# Patient Record
Sex: FEMALE | Race: WHITE | NOT HISPANIC OR LATINO
[De-identification: names, ages, dates, MRNs, and addresses within clinical notes are randomized per-mention and may not be internally consistent; named-entity substitution may affect disease eponyms.]

---

## 2018-03-21 PROBLEM — Z00.00 ENCOUNTER FOR PREVENTIVE HEALTH EXAMINATION: Status: ACTIVE | Noted: 2018-03-21

## 2018-06-19 ENCOUNTER — APPOINTMENT (OUTPATIENT)
Dept: FAMILY MEDICINE | Facility: CLINIC | Age: 28
End: 2018-06-19

## 2020-12-08 ENCOUNTER — TRANSCRIPTION ENCOUNTER (OUTPATIENT)
Age: 30
End: 2020-12-08

## 2022-02-22 ENCOUNTER — APPOINTMENT (OUTPATIENT)
Dept: OPHTHALMOLOGY | Facility: CLINIC | Age: 32
End: 2022-02-22

## 2022-10-24 ENCOUNTER — TRANSCRIPTION ENCOUNTER (OUTPATIENT)
Age: 32
End: 2022-10-24

## 2022-10-24 ENCOUNTER — APPOINTMENT (OUTPATIENT)
Dept: FAMILY MEDICINE | Facility: CLINIC | Age: 32
End: 2022-10-24

## 2022-10-24 ENCOUNTER — NON-APPOINTMENT (OUTPATIENT)
Age: 32
End: 2022-10-24

## 2022-10-24 VITALS
HEIGHT: 65 IN | DIASTOLIC BLOOD PRESSURE: 80 MMHG | HEART RATE: 65 BPM | SYSTOLIC BLOOD PRESSURE: 122 MMHG | OXYGEN SATURATION: 98 % | BODY MASS INDEX: 29.99 KG/M2 | WEIGHT: 180 LBS | RESPIRATION RATE: 14 BRPM

## 2022-10-24 VITALS — TEMPERATURE: 98.5 F

## 2022-10-24 DIAGNOSIS — Z83.438 FAMILY HISTORY OF OTHER DISORDER OF LIPOPROTEIN METABOLISM AND OTHER LIPIDEMIA: ICD-10-CM

## 2022-10-24 DIAGNOSIS — Z82.5 FAMILY HISTORY OF ASTHMA AND OTHER CHRONIC LOWER RESPIRATORY DISEASES: ICD-10-CM

## 2022-10-24 DIAGNOSIS — Z82.49 FAMILY HISTORY OF ISCHEMIC HEART DISEASE AND OTHER DISEASES OF THE CIRCULATORY SYSTEM: ICD-10-CM

## 2022-10-24 DIAGNOSIS — Z76.89 PERSONS ENCOUNTERING HEALTH SERVICES IN OTHER SPECIFIED CIRCUMSTANCES: ICD-10-CM

## 2022-10-24 PROCEDURE — G0444 DEPRESSION SCREEN ANNUAL: CPT | Mod: 59

## 2022-10-24 PROCEDURE — 99204 OFFICE O/P NEW MOD 45 MIN: CPT | Mod: 25

## 2022-10-24 NOTE — PAST MEDICAL HISTORY
----- Message from Taz Banda PA-C sent at 1/7/2021  9:00 AM CST -----  Please let the patient know that his cholesterol level-specifically his ratio-is elevated.  Given his age, we can try to treat this with diet and routine exercise, we would like to have this rechecked in 6 months.  The rest of his lab work looked good.  The next step for his back pain would be to follow up with the MRI as ordered.  He also does have a follow-up with Gastroenterology in just a few days.  He should keep that appointment as well.  Thank you.   [Menstruating] : hx of menstruating [Regular Cycle Intervals] : have been regular [Total Preg ___] : G: [unfilled] [Live Births___] : P: [unfilled]

## 2022-10-25 NOTE — PHYSICAL EXAM
[No Acute Distress] : no acute distress [Normal Sclera/Conjunctiva] : normal sclera/conjunctiva [Normal Outer Ear/Nose] : the outer ears and nose were normal in appearance [No JVD] : no jugular venous distention [No Lymphadenopathy] : no lymphadenopathy [Supple] : supple [Thyroid Normal, No Nodules] : the thyroid was normal and there were no nodules present [No Respiratory Distress] : no respiratory distress  [No Accessory Muscle Use] : no accessory muscle use [Clear to Auscultation] : lungs were clear to auscultation bilaterally [Normal Rate] : normal rate  [Regular Rhythm] : with a regular rhythm [Normal S1, S2] : normal S1 and S2 [No Murmur] : no murmur heard [Declined Rectal Exam] : declined rectal exam [Normal Gait] : normal gait [Normal Affect] : the affect was normal

## 2022-10-25 NOTE — HISTORY OF PRESENT ILLNESS
[FreeTextEntry8] : Patient presents to establish care. Female is  she is complaining of painless rectal bleeding since Friday, when she wipes there is blood on the toilet paper. Denies changes in vision, dizziness, chest pain, palpitations and lower extremity edema.\par \par \par

## 2022-10-25 NOTE — HEALTH RISK ASSESSMENT
[Intercurrent hospitalizations] : was admitted to the hospital  [Former] : Former [No] : In the past 12 months have you used drugs other than those required for medical reasons? No [0] : 2) Feeling down, depressed, or hopeless: Not at all (0) [PHQ-2 Negative - No further assessment needed] : PHQ-2 Negative - No further assessment needed [de-identified] : 8/2022 c- section  [de-identified] : GYN  [de-identified] : cardio  [[id number]] : 0

## 2022-10-25 NOTE — PLAN
[FreeTextEntry1] : recommended Sitz baths \par referred to colorectal surgeon if not improved \par continue prenatal\par RTO as routine for follow up.\par

## 2022-10-25 NOTE — REVIEW OF SYSTEMS
[Abdominal Pain] : no abdominal pain [Nausea] : no nausea [Constipation] : no constipation [Diarrhea] : diarrhea [Vomiting] : no vomiting [Heartburn] : no heartburn [Melena] : no melena [Negative] : Heme/Lymph [FreeTextEntry7] : internal hemorrhoids

## 2022-12-02 ENCOUNTER — APPOINTMENT (OUTPATIENT)
Dept: FAMILY MEDICINE | Facility: CLINIC | Age: 32
End: 2022-12-02

## 2022-12-02 VITALS — SYSTOLIC BLOOD PRESSURE: 146 MMHG | DIASTOLIC BLOOD PRESSURE: 86 MMHG

## 2022-12-02 DIAGNOSIS — R42 DIZZINESS AND GIDDINESS: ICD-10-CM

## 2022-12-02 DIAGNOSIS — R58 HEMORRHAGE, NOT ELSEWHERE CLASSIFIED: ICD-10-CM

## 2022-12-02 DIAGNOSIS — R55 DIZZINESS AND GIDDINESS: ICD-10-CM

## 2022-12-02 DIAGNOSIS — I10 ESSENTIAL (PRIMARY) HYPERTENSION: ICD-10-CM

## 2022-12-02 DIAGNOSIS — K64.8 OTHER HEMORRHOIDS: ICD-10-CM

## 2022-12-02 PROCEDURE — 99214 OFFICE O/P EST MOD 30 MIN: CPT | Mod: 95

## 2022-12-02 NOTE — HISTORY OF PRESENT ILLNESS
-Per primary team   -Expect stress hyperglycemia      [Home] : at home, [unfilled] , at the time of the visit. [Medical Office: ([facility])___] : at the medical office located in  [Verbal consent obtained from patient] : the patient, [unfilled] [FreeTextEntry8] : Verbal consent was obtained from patient for telephonic visit on 22\par \par 32 yr old  female who is breastfeeding states around 1 pm today became dizzy along with nausea. Dizziness is worse when turning head side to side. Admits to preeclampsia and has been taking BP today was 146/92. Denies syncope, palpitations. \par

## 2022-12-02 NOTE — PLAN
[FreeTextEntry1] : Will see patient in office to f/u with BP \par advised to monitor BP at home \par goal < 120/90 \par fall precautions advised \par stay hydrated and activity as tolerated

## 2022-12-04 ENCOUNTER — TRANSCRIPTION ENCOUNTER (OUTPATIENT)
Age: 32
End: 2022-12-04

## 2022-12-06 ENCOUNTER — APPOINTMENT (OUTPATIENT)
Dept: FAMILY MEDICINE | Facility: CLINIC | Age: 32
End: 2022-12-06

## 2023-05-30 ENCOUNTER — APPOINTMENT (OUTPATIENT)
Dept: OPHTHALMOLOGY | Facility: CLINIC | Age: 33
End: 2023-05-30
Payer: COMMERCIAL

## 2023-05-30 ENCOUNTER — NON-APPOINTMENT (OUTPATIENT)
Age: 33
End: 2023-05-30

## 2023-05-30 PROCEDURE — 92004 COMPRE OPH EXAM NEW PT 1/>: CPT

## 2024-04-26 ENCOUNTER — APPOINTMENT (OUTPATIENT)
Dept: FAMILY MEDICINE | Facility: CLINIC | Age: 34
End: 2024-04-26
Payer: COMMERCIAL

## 2024-04-26 DIAGNOSIS — R14.0 ABDOMINAL DISTENSION (GASEOUS): ICD-10-CM

## 2024-04-26 DIAGNOSIS — R63.5 ABNORMAL WEIGHT GAIN: ICD-10-CM

## 2024-04-26 PROCEDURE — 99214 OFFICE O/P EST MOD 30 MIN: CPT

## 2024-04-26 NOTE — PLAN
[FreeTextEntry1] : sent for labs due to c/o abdominal bloating  diet as tolerated  trial of probiotic

## 2024-04-26 NOTE — REVIEW OF SYSTEMS
[Recent Change In Weight] : ~T recent weight change [Negative] : Heme/Lymph [Fever] : no fever [Night Sweats] : no night sweats [Abdominal Pain] : no abdominal pain [Vomiting] : no vomiting [FreeTextEntry7] : bloating

## 2024-04-26 NOTE — HISTORY OF PRESENT ILLNESS
[Home] : at home, [unfilled] , at the time of the visit. [Medical Office: ([facility])___] : at the medical office located in  [Verbal consent obtained from patient] : the patient, [unfilled] [FreeTextEntry8] : Verbal consent was obtained from patient for telehealth visit on 4/26/24  States weight gain and abdominal bloating past 2 months. Denies abdominal pain, nausea and vomiting.

## 2024-06-19 ENCOUNTER — APPOINTMENT (OUTPATIENT)
Dept: OPHTHALMOLOGY | Facility: CLINIC | Age: 34
End: 2024-06-19

## 2024-06-26 ENCOUNTER — APPOINTMENT (OUTPATIENT)
Dept: FAMILY MEDICINE | Facility: CLINIC | Age: 34
End: 2024-06-26
Payer: COMMERCIAL

## 2024-06-26 DIAGNOSIS — N76.0 ACUTE VAGINITIS: ICD-10-CM

## 2024-06-26 DIAGNOSIS — B37.0 CANDIDAL STOMATITIS: ICD-10-CM

## 2024-06-26 DIAGNOSIS — B96.89 ACUTE VAGINITIS: ICD-10-CM

## 2024-06-26 PROCEDURE — 99442: CPT

## 2024-06-26 RX ORDER — NYSTATIN 100000 [USP'U]/ML
100000 SUSPENSION ORAL 4 TIMES DAILY
Qty: 200 | Refills: 0 | Status: ACTIVE | COMMUNITY
Start: 2024-06-26 | End: 1900-01-01

## 2024-06-26 RX ORDER — METRONIDAZOLE 500 MG/1
500 TABLET ORAL TWICE DAILY
Qty: 14 | Refills: 0 | Status: ACTIVE | COMMUNITY
Start: 2024-06-26 | End: 1900-01-01

## 2024-10-15 ENCOUNTER — NON-APPOINTMENT (OUTPATIENT)
Age: 34
End: 2024-10-15

## 2024-11-21 ENCOUNTER — TRANSCRIPTION ENCOUNTER (OUTPATIENT)
Age: 34
End: 2024-11-21

## 2025-04-14 ENCOUNTER — NON-APPOINTMENT (OUTPATIENT)
Age: 35
End: 2025-04-14

## 2025-04-16 ENCOUNTER — APPOINTMENT (OUTPATIENT)
Dept: OPHTHALMOLOGY | Facility: CLINIC | Age: 35
End: 2025-04-16
Payer: COMMERCIAL

## 2025-04-16 ENCOUNTER — NON-APPOINTMENT (OUTPATIENT)
Age: 35
End: 2025-04-16

## 2025-04-16 PROCEDURE — 92014 COMPRE OPH EXAM EST PT 1/>: CPT

## 2025-04-16 PROCEDURE — 92015 DETERMINE REFRACTIVE STATE: CPT

## 2025-07-17 ENCOUNTER — NON-APPOINTMENT (OUTPATIENT)
Age: 35
End: 2025-07-17

## 2025-07-17 ENCOUNTER — APPOINTMENT (OUTPATIENT)
Dept: OPHTHALMOLOGY | Facility: CLINIC | Age: 35
End: 2025-07-17
Payer: COMMERCIAL

## 2025-07-17 PROCEDURE — 92004 COMPRE OPH EXAM NEW PT 1/>: CPT
